# Patient Record
Sex: MALE | Race: WHITE
[De-identification: names, ages, dates, MRNs, and addresses within clinical notes are randomized per-mention and may not be internally consistent; named-entity substitution may affect disease eponyms.]

---

## 2019-08-12 ENCOUNTER — HOSPITAL ENCOUNTER (OUTPATIENT)
Dept: HOSPITAL 50 - VM.SDS | Age: 59
Discharge: HOME | End: 2019-08-12
Attending: SURGERY
Payer: COMMERCIAL

## 2019-08-12 VITALS — DIASTOLIC BLOOD PRESSURE: 95 MMHG | SYSTOLIC BLOOD PRESSURE: 147 MMHG | HEART RATE: 58 BPM

## 2019-08-12 DIAGNOSIS — I10: ICD-10-CM

## 2019-08-12 DIAGNOSIS — E11.9: ICD-10-CM

## 2019-08-12 DIAGNOSIS — E78.00: ICD-10-CM

## 2019-08-12 DIAGNOSIS — Z79.899: ICD-10-CM

## 2019-08-12 DIAGNOSIS — Z79.84: ICD-10-CM

## 2019-08-12 DIAGNOSIS — Z79.82: ICD-10-CM

## 2019-08-12 DIAGNOSIS — Z85.46: ICD-10-CM

## 2019-08-12 DIAGNOSIS — E66.9: ICD-10-CM

## 2019-08-12 DIAGNOSIS — Z88.8: ICD-10-CM

## 2019-08-12 DIAGNOSIS — Z12.11: Primary | ICD-10-CM

## 2019-08-12 DIAGNOSIS — K63.5: ICD-10-CM

## 2019-08-12 DIAGNOSIS — R10.13: ICD-10-CM

## 2019-08-12 DIAGNOSIS — Z86.010: ICD-10-CM

## 2019-08-12 DIAGNOSIS — D50.9: ICD-10-CM

## 2019-08-12 PROCEDURE — 82962 GLUCOSE BLOOD TEST: CPT

## 2019-08-12 PROCEDURE — 45380 COLONOSCOPY AND BIOPSY: CPT

## 2019-08-12 PROCEDURE — 43239 EGD BIOPSY SINGLE/MULTIPLE: CPT

## 2019-08-12 NOTE — OR
PREOPERATIVE DIAGNOSIS:  Mild dyspepsia.

 

POSTOPERATIVE DIAGNOSIS:  Essentially normal exam of the esophagus, stomach, and

duodenum.

 

PROCEDURE PROPOSED:  Upper gastrointestinal panendoscopy with antral biopsies.

 

PROCEDURE DONE:  Upper gastrointestinal panendoscopy with antral biopsies.

 

INDICATION:  This is a 58-year-old gentleman, who has had some mild dyspepsia

symptoms.  He has been gastroscoped before, and he was referred for followup

exam.

 

TECHNIQUE:  The patient was brought to the endoscopy suite, placed in the left

lateral decubitus position.  He was sedated per CRNA with propofol.  A flexible

video gastroscope was then passed transorally after inserting the bite block.

The scope was advanced well into the 3rd and 4th portions of the duodenum.  The

duodenum and duodenal bulb were unremarkable.  The antrum and body of the

stomach looked very normal and healthy without inflammation or ulceration.

Couple of antral biopsies were taken to rule out H. pylori, and the patient was

found to have a normal GE junction without hiatal hernia or GERD or Schatzki's

ring or stenosis, and the remainder of the esophagus was normal as the scope was

then withdrawn.  He tolerated the procedure well.

 

FINAL IMPRESSION:  Essentially normal exam of esophagus, stomach, and duodenum.

 

PLAN:  The patient was reassured and I felt nothing further needed to be done

regarding his exam.  He is also scheduled for a colonoscopy.

 

 

SCM:  08/12/2019 08:20:53  MODL:  08/12/2019 08:49:17

Job #:  [id number]

## 2019-08-26 NOTE — LETTER
2019

 

Judith Victoria

 

RE:  JUDITH VICTORIA :  1960

 

Dear Judith:

 

The biopsies taken from your stomach did not reveal any abnormality and you do

not have the bacteria in your stomach known as H pylori.  The polyp removed from

your colon was totally benign and insignificant.  However, with your history of

polyps, I feel that you should continue with colonoscopies every 5 years

hereafter.

 

 

Respectfully,